# Patient Record
Sex: FEMALE | Race: AMERICAN INDIAN OR ALASKA NATIVE
[De-identification: names, ages, dates, MRNs, and addresses within clinical notes are randomized per-mention and may not be internally consistent; named-entity substitution may affect disease eponyms.]

---

## 2018-02-22 ENCOUNTER — HOSPITAL ENCOUNTER (EMERGENCY)
Dept: HOSPITAL 5 - ED | Age: 23
Discharge: HOME | End: 2018-02-22
Payer: MEDICAID

## 2018-02-22 VITALS — SYSTOLIC BLOOD PRESSURE: 116 MMHG | DIASTOLIC BLOOD PRESSURE: 67 MMHG

## 2018-02-22 DIAGNOSIS — L23.2: Primary | ICD-10-CM

## 2018-02-22 DIAGNOSIS — Z86.718: ICD-10-CM

## 2018-02-22 PROCEDURE — 99282 EMERGENCY DEPT VISIT SF MDM: CPT

## 2018-02-22 NOTE — EMERGENCY DEPARTMENT REPORT
ED Rash HPI





- HPI


Chief Complaint: Skin Rash


Stated Complaint: ALLERGIC REACTION


Time Seen by Provider: 02/22/18 11:01


Duration: 2 Days


Location: Neck, Other (face)


Suspected Cause: Other (hair dye)


Rash Symptoms: Yes Itching, No Facial Swelling, No Tongue/Oral Swelling, No 

Breathing Difficulties, No Choking Sensation, No Wheezing/Dyspnea, No Peeling, 

No Blistering, No Fever, No Lightheaded, No Malaise, No Myalgias


Severity: mild


Other History: She is a 23-year-old female with no prior medical history who 

presents to ED today complaining of facial rash and itching.  Patient states 4 

days ago she got air dye treatment and couple days after that she developed a 

rash on her face and neck.  She denies difficulty breathing, fever, throat 

swelling or any problems.





ED Review of Systems


ROS: 


Stated complaint: ALLERGIC REACTION


Other details as noted in HPI





Constitutional: denies: chills, fever


Eyes: denies: eye pain, eye discharge, vision change


ENT: denies: ear pain, throat pain


Respiratory: denies: cough, shortness of breath, wheezing


Cardiovascular: denies: chest pain, palpitations


Endocrine: no symptoms reported


Gastrointestinal: denies: abdominal pain, nausea, vomiting, diarrhea


Genitourinary: denies: urgency, dysuria, discharge


Musculoskeletal: denies: back pain, joint swelling, arthralgia


Skin: rash, pruritus (cheeks, neck).  denies: lesions


Neurological: denies: headache, weakness, paresthesias


Psychiatric: denies: anxiety, depression


Hematological/Lymphatic: denies: easy bleeding, easy bruising





ED Past Medical Hx





- Past Medical History


Previous Medical History?: Yes


Hx Deep Vein Thrombosis: Yes (2010)





- Surgical History


Past Surgical History?: No





- Social History


Smoking Status: Never Smoker


Substance Use Type: None





- Medications


Home Medications: 


 Home Medications











 Medication  Instructions  Recorded  Confirmed  Last Taken  Type


 


HYDROcodone/APAP 5-325 [[place] 1 - 2 each PO Q6HR PRN #10 tablet 07/07/16  

Unknown Rx





5/325]     


 


Ibuprofen [Motrin 800 MG tab] 800 mg PO Q8HR PRN #10 tablet 07/07/16  Unknown Rx


 


medroxyPROGESTERone ACETATE 5 mg PO QDAY #10 tablet 07/07/16  Unknown Rx





[Provera]     


 


Hydrocortisone/Aloe Vera 1 applic TP TID #1 tube 02/22/18  Unknown Rx





[Cortizone-10 1% Creme]     


 


diphenhydrAMINE [Benadryl CAP] 25 mg PO QHS PRN #20 capsule 02/22/18  Unknown Rx


 


predniSONE [Deltasone] 10 mg PO QDAY #4 tab 02/22/18  Unknown Rx














Rash Exam





- Exam


General: 


Vital signs noted. No distress. Alert and acting appropriately.





HEENT: No Periorbital Edema, No Conjuctival Injection, No Chemosis, No Perioral 

Edema, No Tongue Edema, No Uvular Edema, No Compromised Airway, No Drooling


Lungs: Yes Good Air Exchange (Normal Breath Sounds), No Wheezes, No Ronchi, No 

Stridor, No Cough, No Labored Respirations, No Retractions, No Use of Accessory 

Muscles, No Other Abnormal Lung Sounds


Heart: Yes Regular, No Murmur


Skin: Yes Maculopapular Rash (localized to cheecks and neck), Yes Erythema (

associated with rash, no surrounding), Yes Other (pre and post auricular 

lymphadenopathy), No Urticarial Rash, No Morbilliform rash, No Bulla(e), No 

Excoriations, No Weeping, No Tenderness, No Edema, No Encrustations


Other: Positive: Abdomen Normal, Neurologic Normal, Musculoskeletal Normal





ED Course


 Vital Signs











  02/22/18





  10:41


 


Temperature 98.7 F


 


Pulse Rate 79


 


Respiratory 16





Rate 


 


Blood Pressure 116/67





[Right] 


 


O2 Sat by Pulse 99





Oximetry 














ED Medical Decision Making





- Medical Decision Making


23-year-old female present with allergic contact dermatitis


ED course: Patient received 25 mg of Benadryl and 60 mg of prednisone ED.


Discussed the patient showing negative Benadryl for the next couple days to 

help with itching.


Discussed patient to follow up with primary care physician.


Discuss if worsening symptoms such as shortness of breath or difficulty 

breathing to return to ED immediately


Vital signs are stable patient is in no acute distress she is to rest 

comfortably and eating to room


She had no respiratory distress and ED.











Critical care attestation.: 


If time is entered above; I have spent that time in minutes in the direct care 

of this critically ill patient, excluding procedure time.








ED Disposition


Clinical Impression: 


 Allergic contact dermatitis due to cosmetics





Disposition: DC-01 TO HOME OR SELFCARE


Is pt being admited?: No


Does the pt Need Aspirin: No


Condition: Stable


Instructions:  Contact Dermatitis (ED)


Additional Instructions: 


Make sure to follow up with the primary care physician as discussed.


Take all your medications as you've been prescribed.


If you have any worsening symptoms or develop new symptoms please return to ED 

immediately.


Prescriptions: 


diphenhydrAMINE [Benadryl CAP] 25 mg PO QHS PRN #20 capsule


 PRN Reason: Allergic Reaction


Hydrocortisone/Aloe Vera [Cortizone-10 1% Creme] 1 applic TP TID #1 tube


predniSONE [Deltasone] 10 mg PO QDAY #4 tab


Referrals: 


[facility] [place] [facility] [place] - 3-5 Days


[facility] [Outside] - 3-5 Days


[facility] [Outside] - 3-5 Days


Forms:  Accompanied Note, Work/School Release Form(ED)


Time of Disposition: 11:11

## 2018-05-18 ENCOUNTER — HOSPITAL ENCOUNTER (EMERGENCY)
Dept: HOSPITAL 5 - ED | Age: 23
Discharge: HOME | End: 2018-05-18
Payer: MEDICAID

## 2018-05-18 VITALS — DIASTOLIC BLOOD PRESSURE: 71 MMHG | SYSTOLIC BLOOD PRESSURE: 119 MMHG

## 2018-05-18 DIAGNOSIS — Z86.718: ICD-10-CM

## 2018-05-18 DIAGNOSIS — J20.9: Primary | ICD-10-CM

## 2018-05-18 DIAGNOSIS — J45.909: ICD-10-CM

## 2018-05-18 LAB
BASOPHILS # (AUTO): 0 K/MM3 (ref 0–0.1)
BASOPHILS NFR BLD AUTO: 0.3 % (ref 0–1.8)
BUN SERPL-MCNC: 5 MG/DL (ref 7–17)
BUN/CREAT SERPL: 10 %
CALCIUM SERPL-MCNC: 9.3 MG/DL (ref 8.4–10.2)
EOSINOPHIL # BLD AUTO: 0.1 K/MM3 (ref 0–0.4)
EOSINOPHIL NFR BLD AUTO: 0.9 % (ref 0–4.3)
HCT VFR BLD CALC: 44.4 % (ref 30.3–42.9)
HEMOLYSIS INDEX: 103
HGB BLD-MCNC: 14.7 GM/DL (ref 10.1–14.3)
LYMPHOCYTES # BLD AUTO: 1.1 K/MM3 (ref 1.2–5.4)
LYMPHOCYTES NFR BLD AUTO: 15.9 % (ref 13.4–35)
MCH RBC QN AUTO: 28 PG (ref 28–32)
MCHC RBC AUTO-ENTMCNC: 33 % (ref 30–34)
MCV RBC AUTO: 83 FL (ref 79–97)
MONOCYTES # (AUTO): 1 K/MM3 (ref 0–0.8)
MONOCYTES % (AUTO): 14.6 % (ref 0–7.3)
PLATELET # BLD: 219 K/MM3 (ref 140–440)
RBC # BLD AUTO: 5.33 M/MM3 (ref 3.65–5.03)

## 2018-05-18 PROCEDURE — 85025 COMPLETE CBC W/AUTO DIFF WBC: CPT

## 2018-05-18 PROCEDURE — 71275 CT ANGIOGRAPHY CHEST: CPT

## 2018-05-18 PROCEDURE — 84703 CHORIONIC GONADOTROPIN ASSAY: CPT

## 2018-05-18 PROCEDURE — 99284 EMERGENCY DEPT VISIT MOD MDM: CPT

## 2018-05-18 PROCEDURE — 85379 FIBRIN DEGRADATION QUANT: CPT

## 2018-05-18 PROCEDURE — 80048 BASIC METABOLIC PNL TOTAL CA: CPT

## 2018-05-18 PROCEDURE — 36415 COLL VENOUS BLD VENIPUNCTURE: CPT

## 2018-05-18 PROCEDURE — 71046 X-RAY EXAM CHEST 2 VIEWS: CPT

## 2018-05-18 NOTE — CAT SCAN REPORT
FINAL REPORT



EXAM:  CT ANGIO CHEST



HISTORY:  chest pain + d-dimer ? PE, 



TECHNIQUE:  CT chest CT angiogram with reconstructions 



PRIORS:  None.



FINDINGS:  

There is no evidence of filling defect within the central

pulmonary vasculature to suggest the presence of acute pulmonary

embolus. 



No evidence of mediastinal pathologic lymph node enlargement 



Heart and great vessels are unremarkable.  The aorta is normal in

caliber. 



No focal pulmonary infiltrate identified.  No pleural fluid

collection seen. No acute pulmonary abnormality noted. 



Visualized portion of the upper abdomen demonstrates no acute

change. 



IMPRESSION:  

Negative. No CT evidence of acute pulmonary embolus

## 2018-05-18 NOTE — XRAY REPORT
Chest 2 views:



History: Persistent cough.



Findings:



Normal cardiomediastinal silhouette. Trachea is midline. No 

consolidation, pneumothorax or pleural effusion.



Impression:



No acute cardiopulmonary findings.

## 2018-05-18 NOTE — EMERGENCY DEPARTMENT REPORT
- General


Chief Complaint: Upper Respiratory Infection


Stated Complaint: COLD SYMPTOMS


Time Seen by Provider: 18 12:30


Source: patient


Mode of arrival: Ambulatory


Limitations: No Limitations





- History of Present Illness


Initial Comments: 


23-year-old female with past medical history DVT presents with complaint of 

several days of intermittent shortness of breath and some pleuritic chest 

discomfort and nonproductive cough.  Denies nausea vomiting abdominal pain or 

palpitations.  Denies any dysuria or increased urinary frequency.  Patient 

states she feels slightly short of breath with cough.  Patient is currently 

awake alert and oriented 3. 


MD Complaint: cough


Onset/Timin


-: days(s)


Severity: moderate


Severity scale (0 -10): 5


Quality: aching


Consistency: intermittent


Improves With: nothing


Associated Symptoms: cough, chest pain


Treatments Prior to Arrival: none





- Related Data


 Previous Rx's











 Medication  Instructions  Recorded  Last Taken  Type


 


HYDROcodone/APAP 5-325 [[place] 1 - 2 each PO Q6HR PRN #10 tablet 16 

Unknown Rx





5/325]    


 


Ibuprofen [Motrin 800 MG tab] 800 mg PO Q8HR PRN #10 tablet 16 Unknown Rx


 


medroxyPROGESTERone ACETATE 5 mg PO QDAY #10 tablet 16 Unknown Rx





[Provera]    


 


Hydrocortisone/Aloe Vera 1 applic TP TID #1 tube 18 Unknown Rx





[Cortizone-10 1% Creme]    


 


diphenhydrAMINE [Benadryl CAP] 25 mg PO QHS PRN #20 capsule 18 Unknown Rx


 


predniSONE [Deltasone] 10 mg PO QDAY #4 tab 18 Unknown Rx


 


Albuterol Sulfate [Ventolin HFA] 2 puff IH Q4H PRN #1 hfa.aer.ad 18 

Unknown Rx


 


Azithromycin [Zithromax Z-KATHLEEN] 250 mg PO QDAY #1 pack 18 Unknown Rx


 


Benzonatate [Tessalon Perles] 100 mg PO Q8HR PRN #30 capsule 18 Unknown Rx


 


Ibuprofen [Motrin] 800 mg PO Q8HR PRN #20 tablet 18 Unknown Rx


 


Phenylephrine/Dm/Acetaminop/GG 10 ml PO Q6H PRN #1 liquid 18 Unknown Rx





[Mucinex Cold-Flu-Sorethroat Lq]    











 Allergies











Allergy/AdvReac Type Severity Reaction Status Date / Time


 


No Known Allergies Allergy   Unverified 16 17:33














ED Review of Systems


ROS: 


Stated complaint: COLD SYMPTOMS


Other details as noted in HPI





Constitutional: denies: chills, fever


Eyes: denies: eye pain, eye discharge, vision change


ENT: denies: ear pain, throat pain


Respiratory: cough.  denies: shortness of breath, wheezing


Cardiovascular: chest pain.  denies: palpitations


Endocrine: no symptoms reported


Gastrointestinal: denies: abdominal pain, nausea, diarrhea


Genitourinary: denies: urgency, dysuria, discharge


Musculoskeletal: denies: back pain, joint swelling, arthralgia


Skin: denies: rash, lesions


Neurological: denies: headache, weakness, paresthesias


Psychiatric: denies: anxiety, depression


Hematological/Lymphatic: denies: easy bleeding, easy bruising





ED Past Medical Hx





- Past Medical History


Hx Deep Vein Thrombosis: Yes ()





- Social History


Smoking Status: Never Smoker


Substance Use Type: None





- Medications


Home Medications: 


 Home Medications











 Medication  Instructions  Recorded  Confirmed  Last Taken  Type


 


HYDROcodone/APAP 5-325 [[place] 1 - 2 each PO Q6HR PRN #10 tablet 16  

Unknown Rx





5/325]     


 


Ibuprofen [Motrin 800 MG tab] 800 mg PO Q8HR PRN #10 tablet 16  Unknown Rx


 


medroxyPROGESTERone ACETATE 5 mg PO QDAY #10 tablet 16  Unknown Rx





[Provera]     


 


Hydrocortisone/Aloe Vera 1 applic TP TID #1 tube 18  Unknown Rx





[Cortizone-10 1% Creme]     


 


diphenhydrAMINE [Benadryl CAP] 25 mg PO QHS PRN #20 capsule 18  Unknown Rx


 


predniSONE [Deltasone] 10 mg PO QDAY #4 tab 18  Unknown Rx


 


Albuterol Sulfate [Ventolin HFA] 2 puff IH Q4H PRN #1 hfa.aer.ad 18  

Unknown Rx


 


Azithromycin [Zithromax Z-KATHLEEN] 250 mg PO QDAY #1 pack 18  Unknown Rx


 


Benzonatate [Tessalon Perles] 100 mg PO Q8HR PRN #30 capsule 18  Unknown 

Rx


 


Ibuprofen [Motrin] 800 mg PO Q8HR PRN #20 tablet 18  Unknown Rx


 


Phenylephrine/Dm/Acetaminop/GG 10 ml PO Q6H PRN #1 liquid 18  Unknown Rx





[Mucinex Cold-Flu-Sorethroat Lq]     














ED Physical Exam





- General


Limitations: No Limitations


General appearance: alert, in no apparent distress





- Head


Head exam: Present: atraumatic, normocephalic





- Eye


Eye exam: Present: normal appearance, PERRL, EOMI





- ENT


ENT exam: Present: mucous membranes moist





- Neck


Neck exam: Present: normal inspection





- Respiratory


Respiratory exam: Present: normal lung sounds bilaterally.  Absent: respiratory 

distress





- Cardiovascular


Cardiovascular Exam: Present: regular rate, normal rhythm.  Absent: systolic 

murmur, diastolic murmur, rubs, gallop





- GI/Abdominal


GI/Abdominal exam: Present: soft, normal bowel sounds





- Extremities Exam


Extremities exam: Present: normal inspection





- Back Exam


Back exam: Present: normal inspection





- Neurological Exam


Neurological exam: Present: alert, oriented X3





- Psychiatric


Psychiatric exam: Present: normal affect, normal mood





- Skin


Skin exam: Present: warm, dry, intact, normal color.  Absent: rash





ED Course


 Vital Signs











  18





  12:00 13:27


 


Temperature 99.9 F H 


 


Pulse Rate 97 H 


 


Respiratory  18





Rate  


 


Blood Pressure 119/71 


 


O2 Sat by Pulse 98 





Oximetry  














ED Medical Decision Making





- Lab Data


Result diagrams: 


 18 13:28





 18 13:28





- Medical Decision Making


A/P: Acute bronchitis, costochondritis


1-as d-dimer is positive CT angio, chest performed.  Negative for PE


2-treat patient empirically with NSAIDs and cough medicines.  Patient is a 

heavy smoker we'll treat for bronchitis


3-f/u with primary care doctor


Critical care attestation.: 


If time is entered above; I have spent that time in minutes in the direct care 

of this critically ill patient, excluding procedure time.








ED Disposition


Clinical Impression: 


Acute bronchitis


Qualifiers:


 Bronchitis organism: unspecified organism Qualified Code(s): J20.9 - Acute 

bronchitis, unspecified





Reactive airway disease


Qualifiers:


 Asthma severity: mild Asthma complication type: with acute exacerbation 





Disposition: DC-01 TO HOME OR SELFCARE


Is pt being admited?: No


Does the pt Need Aspirin: No


Condition: Stable


Instructions:  Acute Bronchitis (ED)


Prescriptions: 


Albuterol Sulfate [Ventolin HFA] 2 puff IH Q4H PRN #1 hfa.aer.ad


 PRN Reason: Shortness Of Breath


Azithromycin [Zithromax Z-KATHLEEN] 250 mg PO QDAY #1 pack


Benzonatate [Tessalon Perles] 100 mg PO Q8HR PRN #30 capsule


 PRN Reason: Cough


Ibuprofen [Motrin] 800 mg PO Q8HR PRN #20 tablet


 PRN Reason: Pain


Phenylephrine/Dm/Acetaminop/GG [Mucinex Cold-Flu-Sorethroat Lq] 10 ml PO Q6H 

PRN #1 liquid


 PRN Reason: Cough


Referrals: 


[facility] [Provider Group] - 3-5 Days


[facility] [Outside] - 3-5 Days


Forms:  Work/School Release Form(ED)


Time of Disposition: 16:48